# Patient Record
Sex: FEMALE | Race: WHITE | URBAN - METROPOLITAN AREA
[De-identification: names, ages, dates, MRNs, and addresses within clinical notes are randomized per-mention and may not be internally consistent; named-entity substitution may affect disease eponyms.]

---

## 2017-06-08 ENCOUNTER — OUTPATIENT (OUTPATIENT)
Dept: OUTPATIENT SERVICES | Facility: HOSPITAL | Age: 67
LOS: 1 days | Discharge: HOME | End: 2017-06-08

## 2017-06-08 DIAGNOSIS — M17.10 UNILATERAL PRIMARY OSTEOARTHRITIS, UNSPECIFIED KNEE: ICD-10-CM

## 2017-06-08 PROBLEM — Z00.00 ENCOUNTER FOR PREVENTIVE HEALTH EXAMINATION: Status: ACTIVE | Noted: 2017-06-08

## 2017-06-13 PROBLEM — N60.92 ATYPICAL DUCTAL HYPERPLASIA OF LEFT BREAST: Status: ACTIVE | Noted: 2017-06-13

## 2017-06-13 PROBLEM — D24.2 INTRADUCTAL PAPILLOMA OF LEFT BREAST: Status: ACTIVE | Noted: 2017-06-13

## 2017-06-15 ENCOUNTER — APPOINTMENT (OUTPATIENT)
Dept: BREAST CENTER | Facility: CLINIC | Age: 67
End: 2017-06-15

## 2017-06-15 VITALS
SYSTOLIC BLOOD PRESSURE: 140 MMHG | WEIGHT: 200 LBS | HEIGHT: 62 IN | BODY MASS INDEX: 36.8 KG/M2 | DIASTOLIC BLOOD PRESSURE: 84 MMHG

## 2017-06-15 DIAGNOSIS — K21.9 GASTRO-ESOPHAGEAL REFLUX DISEASE W/OUT ESOPHAGITIS: ICD-10-CM

## 2017-06-15 DIAGNOSIS — I10 ESSENTIAL (PRIMARY) HYPERTENSION: ICD-10-CM

## 2017-06-15 DIAGNOSIS — N60.92 UNSPECIFIED BENIGN MAMMARY DYSPLASIA OF LEFT BREAST: ICD-10-CM

## 2017-06-15 DIAGNOSIS — Z87.891 PERSONAL HISTORY OF NICOTINE DEPENDENCE: ICD-10-CM

## 2017-06-15 DIAGNOSIS — D24.2 BENIGN NEOPLASM OF LEFT BREAST: ICD-10-CM

## 2017-06-28 ENCOUNTER — OUTPATIENT (OUTPATIENT)
Dept: PREADMISSION | Facility: HOSPITAL | Age: 67
LOS: 1 days | Discharge: HOME | End: 2017-06-28

## 2017-06-28 DIAGNOSIS — Z12.31 ENCOUNTER FOR SCREENING MAMMOGRAM FOR MALIGNANT NEOPLASM OF BREAST: ICD-10-CM

## 2017-06-28 DIAGNOSIS — Z01.818 ENCOUNTER FOR OTHER PREPROCEDURAL EXAMINATION: ICD-10-CM

## 2017-06-28 DIAGNOSIS — M17.11 UNILATERAL PRIMARY OSTEOARTHRITIS, RIGHT KNEE: ICD-10-CM

## 2017-06-28 DIAGNOSIS — M17.10 UNILATERAL PRIMARY OSTEOARTHRITIS, UNSPECIFIED KNEE: ICD-10-CM

## 2017-06-29 DIAGNOSIS — E11.9 TYPE 2 DIABETES MELLITUS WITHOUT COMPLICATIONS: ICD-10-CM

## 2017-06-29 DIAGNOSIS — G47.30 SLEEP APNEA, UNSPECIFIED: ICD-10-CM

## 2017-06-29 DIAGNOSIS — K21.9 GASTRO-ESOPHAGEAL REFLUX DISEASE WITHOUT ESOPHAGITIS: ICD-10-CM

## 2017-06-29 DIAGNOSIS — I10 ESSENTIAL (PRIMARY) HYPERTENSION: ICD-10-CM

## 2017-07-19 ENCOUNTER — INPATIENT (INPATIENT)
Facility: HOSPITAL | Age: 67
LOS: 3 days | Discharge: HOME | End: 2017-07-23
Attending: ORTHOPAEDIC SURGERY

## 2017-07-19 DIAGNOSIS — M17.10 UNILATERAL PRIMARY OSTEOARTHRITIS, UNSPECIFIED KNEE: ICD-10-CM

## 2017-07-26 DIAGNOSIS — E11.9 TYPE 2 DIABETES MELLITUS WITHOUT COMPLICATIONS: ICD-10-CM

## 2017-07-26 DIAGNOSIS — E66.01 MORBID (SEVERE) OBESITY DUE TO EXCESS CALORIES: ICD-10-CM

## 2017-07-26 DIAGNOSIS — I10 ESSENTIAL (PRIMARY) HYPERTENSION: ICD-10-CM

## 2017-07-26 DIAGNOSIS — M17.11 UNILATERAL PRIMARY OSTEOARTHRITIS, RIGHT KNEE: ICD-10-CM

## 2017-07-26 DIAGNOSIS — K21.9 GASTRO-ESOPHAGEAL REFLUX DISEASE WITHOUT ESOPHAGITIS: ICD-10-CM

## 2017-07-26 DIAGNOSIS — G47.33 OBSTRUCTIVE SLEEP APNEA (ADULT) (PEDIATRIC): ICD-10-CM

## 2017-12-28 ENCOUNTER — OUTPATIENT (OUTPATIENT)
Dept: OUTPATIENT SERVICES | Facility: HOSPITAL | Age: 67
LOS: 1 days | Discharge: HOME | End: 2017-12-28

## 2017-12-28 DIAGNOSIS — Z96.651 PRESENCE OF RIGHT ARTIFICIAL KNEE JOINT: ICD-10-CM

## 2017-12-28 DIAGNOSIS — M19.90 UNSPECIFIED OSTEOARTHRITIS, UNSPECIFIED SITE: ICD-10-CM

## 2018-06-21 ENCOUNTER — APPOINTMENT (OUTPATIENT)
Dept: BREAST CENTER | Facility: CLINIC | Age: 68
End: 2018-06-21

## 2018-11-17 ENCOUNTER — OUTPATIENT (OUTPATIENT)
Dept: OUTPATIENT SERVICES | Facility: HOSPITAL | Age: 68
LOS: 1 days | Discharge: HOME | End: 2018-11-17

## 2018-11-17 DIAGNOSIS — E66.9 OBESITY, UNSPECIFIED: ICD-10-CM

## 2018-11-17 DIAGNOSIS — E53.9 VITAMIN B DEFICIENCY, UNSPECIFIED: ICD-10-CM

## 2018-11-17 DIAGNOSIS — E55.9 VITAMIN D DEFICIENCY, UNSPECIFIED: ICD-10-CM

## 2018-11-17 DIAGNOSIS — M25.561 PAIN IN RIGHT KNEE: ICD-10-CM

## 2018-11-17 DIAGNOSIS — R73.09 OTHER ABNORMAL GLUCOSE: ICD-10-CM

## 2018-11-17 DIAGNOSIS — M54.30 SCIATICA, UNSPECIFIED SIDE: ICD-10-CM

## 2018-11-17 DIAGNOSIS — I10 ESSENTIAL (PRIMARY) HYPERTENSION: ICD-10-CM

## 2018-11-17 DIAGNOSIS — E78.5 HYPERLIPIDEMIA, UNSPECIFIED: ICD-10-CM

## 2018-11-17 DIAGNOSIS — D64.9 ANEMIA, UNSPECIFIED: ICD-10-CM

## 2019-02-01 ENCOUNTER — OUTPATIENT (OUTPATIENT)
Dept: OUTPATIENT SERVICES | Facility: HOSPITAL | Age: 69
LOS: 1 days | Discharge: HOME | End: 2019-02-01

## 2019-02-01 DIAGNOSIS — M51.14 INTERVERTEBRAL DISC DISORDERS WITH RADICULOPATHY, THORACIC REGION: ICD-10-CM

## 2019-10-19 ENCOUNTER — OUTPATIENT (OUTPATIENT)
Dept: OUTPATIENT SERVICES | Facility: HOSPITAL | Age: 69
LOS: 1 days | Discharge: HOME | End: 2019-10-19

## 2019-10-19 DIAGNOSIS — R73.9 HYPERGLYCEMIA, UNSPECIFIED: ICD-10-CM

## 2019-10-19 DIAGNOSIS — I10 ESSENTIAL (PRIMARY) HYPERTENSION: ICD-10-CM

## 2019-10-19 DIAGNOSIS — E66.9 OBESITY, UNSPECIFIED: ICD-10-CM

## 2019-10-19 DIAGNOSIS — Z79.899 OTHER LONG TERM (CURRENT) DRUG THERAPY: ICD-10-CM

## 2019-10-23 ENCOUNTER — OUTPATIENT (OUTPATIENT)
Dept: OUTPATIENT SERVICES | Facility: HOSPITAL | Age: 69
LOS: 1 days | Discharge: HOME | End: 2019-10-23
Payer: MEDICARE

## 2019-10-23 DIAGNOSIS — Z12.31 ENCOUNTER FOR SCREENING MAMMOGRAM FOR MALIGNANT NEOPLASM OF BREAST: ICD-10-CM

## 2019-10-23 PROCEDURE — 77067 SCR MAMMO BI INCL CAD: CPT | Mod: 26

## 2019-10-23 PROCEDURE — 77063 BREAST TOMOSYNTHESIS BI: CPT | Mod: 26

## 2019-10-24 DIAGNOSIS — Z13.820 ENCOUNTER FOR SCREENING FOR OSTEOPOROSIS: ICD-10-CM

## 2019-10-24 DIAGNOSIS — Z82.62 FAMILY HISTORY OF OSTEOPOROSIS: ICD-10-CM

## 2019-10-24 DIAGNOSIS — N95.9 UNSPECIFIED MENOPAUSAL AND PERIMENOPAUSAL DISORDER: ICD-10-CM

## 2020-02-03 ENCOUNTER — OUTPATIENT (OUTPATIENT)
Dept: OUTPATIENT SERVICES | Facility: HOSPITAL | Age: 70
LOS: 1 days | Discharge: HOME | End: 2020-02-03
Payer: OTHER MISCELLANEOUS

## 2020-02-03 DIAGNOSIS — M53.3 SACROCOCCYGEAL DISORDERS, NOT ELSEWHERE CLASSIFIED: ICD-10-CM

## 2020-02-03 DIAGNOSIS — M16.0 BILATERAL PRIMARY OSTEOARTHRITIS OF HIP: ICD-10-CM

## 2020-02-03 DIAGNOSIS — M47.816 SPONDYLOSIS WITHOUT MYELOPATHY OR RADICULOPATHY, LUMBAR REGION: ICD-10-CM

## 2020-02-03 PROCEDURE — 77012 CT SCAN FOR NEEDLE BIOPSY: CPT | Mod: 26

## 2020-02-03 PROCEDURE — 27096 INJECT SACROILIAC JOINT: CPT | Mod: LT

## 2020-02-03 PROCEDURE — 72192 CT PELVIS W/O DYE: CPT | Mod: 26

## 2020-02-03 NOTE — PROGRESS NOTE ADULT - SUBJECTIVE AND OBJECTIVE BOX
PREOPERATIVE DAY OF PROCEDURE EVALUATION:     I have personally seen and examined this patient. I agree with the history and physical which I have reviewed and noted any changes below:     Plan is for CT-directed left sacroiliac joint injection.    Procedure/ risks/ benefits/ goals/ alternatives were explained. All questions answered. Informed content obtained from patient. Consent placed in chart.

## 2020-06-26 ENCOUNTER — OUTPATIENT (OUTPATIENT)
Dept: OUTPATIENT SERVICES | Facility: HOSPITAL | Age: 70
LOS: 1 days | Discharge: HOME | End: 2020-06-26

## 2020-06-26 DIAGNOSIS — Z11.59 ENCOUNTER FOR SCREENING FOR OTHER VIRAL DISEASES: ICD-10-CM

## 2020-06-29 ENCOUNTER — INPATIENT (INPATIENT)
Facility: HOSPITAL | Age: 70
LOS: 0 days | Discharge: HOME | End: 2020-06-30
Attending: HOSPITALIST | Admitting: HOSPITALIST
Payer: MEDICARE

## 2020-06-29 ENCOUNTER — RESULT REVIEW (OUTPATIENT)
Age: 70
End: 2020-06-29

## 2020-06-29 ENCOUNTER — TRANSCRIPTION ENCOUNTER (OUTPATIENT)
Age: 70
End: 2020-06-29

## 2020-06-29 VITALS
DIASTOLIC BLOOD PRESSURE: 90 MMHG | WEIGHT: 199.96 LBS | TEMPERATURE: 98 F | RESPIRATION RATE: 18 BRPM | SYSTOLIC BLOOD PRESSURE: 147 MMHG | HEIGHT: 62 IN | HEART RATE: 66 BPM

## 2020-06-29 DIAGNOSIS — Z90.89 ACQUIRED ABSENCE OF OTHER ORGANS: Chronic | ICD-10-CM

## 2020-06-29 DIAGNOSIS — Z90.49 ACQUIRED ABSENCE OF OTHER SPECIFIED PARTS OF DIGESTIVE TRACT: Chronic | ICD-10-CM

## 2020-06-29 DIAGNOSIS — M17.10 UNILATERAL PRIMARY OSTEOARTHRITIS, UNSPECIFIED KNEE: Chronic | ICD-10-CM

## 2020-06-29 LAB
ALBUMIN SERPL ELPH-MCNC: 4.3 G/DL — SIGNIFICANT CHANGE UP (ref 3.5–5.2)
ALP SERPL-CCNC: 80 U/L — SIGNIFICANT CHANGE UP (ref 30–115)
ALT FLD-CCNC: 29 U/L — SIGNIFICANT CHANGE UP (ref 0–41)
ANION GAP SERPL CALC-SCNC: 15 MMOL/L — HIGH (ref 7–14)
AST SERPL-CCNC: 29 U/L — SIGNIFICANT CHANGE UP (ref 0–41)
BASOPHILS # BLD AUTO: 0.04 K/UL — SIGNIFICANT CHANGE UP (ref 0–0.2)
BASOPHILS NFR BLD AUTO: 0.3 % — SIGNIFICANT CHANGE UP (ref 0–1)
BILIRUB SERPL-MCNC: 1 MG/DL — SIGNIFICANT CHANGE UP (ref 0.2–1.2)
BUN SERPL-MCNC: 14 MG/DL — SIGNIFICANT CHANGE UP (ref 10–20)
CALCIUM SERPL-MCNC: 9.1 MG/DL — SIGNIFICANT CHANGE UP (ref 8.5–10.1)
CHLORIDE SERPL-SCNC: 102 MMOL/L — SIGNIFICANT CHANGE UP (ref 98–110)
CO2 SERPL-SCNC: 22 MMOL/L — SIGNIFICANT CHANGE UP (ref 17–32)
CREAT SERPL-MCNC: 0.7 MG/DL — SIGNIFICANT CHANGE UP (ref 0.7–1.5)
EOSINOPHIL # BLD AUTO: 0.07 K/UL — SIGNIFICANT CHANGE UP (ref 0–0.7)
EOSINOPHIL NFR BLD AUTO: 0.5 % — SIGNIFICANT CHANGE UP (ref 0–8)
GLUCOSE SERPL-MCNC: 153 MG/DL — HIGH (ref 70–99)
HCT VFR BLD CALC: 35 % — LOW (ref 37–47)
HGB BLD-MCNC: 11.8 G/DL — LOW (ref 12–16)
IMM GRANULOCYTES NFR BLD AUTO: 0.5 % — HIGH (ref 0.1–0.3)
LYMPHOCYTES # BLD AUTO: 1.76 K/UL — SIGNIFICANT CHANGE UP (ref 1.2–3.4)
LYMPHOCYTES # BLD AUTO: 13.5 % — LOW (ref 20.5–51.1)
MCHC RBC-ENTMCNC: 32.7 PG — HIGH (ref 27–31)
MCHC RBC-ENTMCNC: 33.7 G/DL — SIGNIFICANT CHANGE UP (ref 32–37)
MCV RBC AUTO: 97 FL — SIGNIFICANT CHANGE UP (ref 81–99)
MONOCYTES # BLD AUTO: 0.86 K/UL — HIGH (ref 0.1–0.6)
MONOCYTES NFR BLD AUTO: 6.6 % — SIGNIFICANT CHANGE UP (ref 1.7–9.3)
NEUTROPHILS # BLD AUTO: 10.21 K/UL — HIGH (ref 1.4–6.5)
NEUTROPHILS NFR BLD AUTO: 78.6 % — HIGH (ref 42.2–75.2)
NRBC # BLD: 0 /100 WBCS — SIGNIFICANT CHANGE UP (ref 0–0)
PLATELET # BLD AUTO: 236 K/UL — SIGNIFICANT CHANGE UP (ref 130–400)
POTASSIUM SERPL-MCNC: 3.8 MMOL/L — SIGNIFICANT CHANGE UP (ref 3.5–5)
POTASSIUM SERPL-SCNC: 3.8 MMOL/L — SIGNIFICANT CHANGE UP (ref 3.5–5)
PROT SERPL-MCNC: 7.2 G/DL — SIGNIFICANT CHANGE UP (ref 6–8)
RBC # BLD: 3.61 M/UL — LOW (ref 4.2–5.4)
RBC # FLD: 12.2 % — SIGNIFICANT CHANGE UP (ref 11.5–14.5)
SODIUM SERPL-SCNC: 139 MMOL/L — SIGNIFICANT CHANGE UP (ref 135–146)
WBC # BLD: 13 K/UL — HIGH (ref 4.8–10.8)
WBC # FLD AUTO: 13 K/UL — HIGH (ref 4.8–10.8)

## 2020-06-29 PROCEDURE — 93010 ELECTROCARDIOGRAM REPORT: CPT

## 2020-06-29 PROCEDURE — 45381 COLONOSCOPY SUBMUCOUS NJX: CPT | Mod: XU,XP

## 2020-06-29 PROCEDURE — 88307 TISSUE EXAM BY PATHOLOGIST: CPT | Mod: 26

## 2020-06-29 PROCEDURE — 74019 RADEX ABDOMEN 2 VIEWS: CPT | Mod: 26

## 2020-06-29 PROCEDURE — 45172 EXC RECT TUM TRANSANAL FULL: CPT | Mod: XP

## 2020-06-29 RX ORDER — METRONIDAZOLE 500 MG
500 TABLET ORAL EVERY 8 HOURS
Refills: 0 | Status: DISCONTINUED | OUTPATIENT
Start: 2020-06-30 | End: 2020-06-30

## 2020-06-29 RX ORDER — ATENOLOL 25 MG/1
1 TABLET ORAL
Qty: 0 | Refills: 0 | DISCHARGE

## 2020-06-29 RX ORDER — PANTOPRAZOLE SODIUM 20 MG/1
40 TABLET, DELAYED RELEASE ORAL
Refills: 0 | Status: DISCONTINUED | OUTPATIENT
Start: 2020-06-29 | End: 2020-06-30

## 2020-06-29 RX ORDER — HEPARIN SODIUM 5000 [USP'U]/ML
5000 INJECTION INTRAVENOUS; SUBCUTANEOUS EVERY 12 HOURS
Refills: 0 | Status: DISCONTINUED | OUTPATIENT
Start: 2020-06-29 | End: 2020-06-30

## 2020-06-29 RX ORDER — CIPROFLOXACIN LACTATE 400MG/40ML
400 VIAL (ML) INTRAVENOUS ONCE
Refills: 0 | Status: COMPLETED | OUTPATIENT
Start: 2020-06-29 | End: 2020-06-29

## 2020-06-29 RX ORDER — ACETAMINOPHEN 500 MG
1000 TABLET ORAL ONCE
Refills: 0 | Status: COMPLETED | OUTPATIENT
Start: 2020-06-29 | End: 2020-06-29

## 2020-06-29 RX ORDER — METRONIDAZOLE 500 MG
500 TABLET ORAL ONCE
Refills: 0 | Status: COMPLETED | OUTPATIENT
Start: 2020-06-29 | End: 2020-06-29

## 2020-06-29 RX ORDER — HYDROMORPHONE HYDROCHLORIDE 2 MG/ML
0.5 INJECTION INTRAMUSCULAR; INTRAVENOUS; SUBCUTANEOUS
Refills: 0 | Status: DISCONTINUED | OUTPATIENT
Start: 2020-06-29 | End: 2020-06-30

## 2020-06-29 RX ORDER — SODIUM CHLORIDE 9 MG/ML
1000 INJECTION, SOLUTION INTRAVENOUS
Refills: 0 | Status: DISCONTINUED | OUTPATIENT
Start: 2020-06-29 | End: 2020-06-29

## 2020-06-29 RX ORDER — CIPROFLOXACIN LACTATE 400MG/40ML
400 VIAL (ML) INTRAVENOUS EVERY 12 HOURS
Refills: 0 | Status: DISCONTINUED | OUTPATIENT
Start: 2020-06-30 | End: 2020-06-30

## 2020-06-29 RX ORDER — PERINDOPRIL ERBUMINE 8 MG/1
1 TABLET ORAL
Qty: 0 | Refills: 0 | DISCHARGE

## 2020-06-29 RX ORDER — LISINOPRIL 2.5 MG/1
20 TABLET ORAL DAILY
Refills: 0 | Status: DISCONTINUED | OUTPATIENT
Start: 2020-06-29 | End: 2020-06-30

## 2020-06-29 RX ORDER — CIPROFLOXACIN LACTATE 400MG/40ML
VIAL (ML) INTRAVENOUS
Refills: 0 | Status: DISCONTINUED | OUTPATIENT
Start: 2020-06-29 | End: 2020-06-30

## 2020-06-29 RX ORDER — SODIUM CHLORIDE 9 MG/ML
1000 INJECTION, SOLUTION INTRAVENOUS
Refills: 0 | Status: DISCONTINUED | OUTPATIENT
Start: 2020-06-29 | End: 2020-06-30

## 2020-06-29 RX ORDER — SIMETHICONE 80 MG/1
80 TABLET, CHEWABLE ORAL ONCE
Refills: 0 | Status: COMPLETED | OUTPATIENT
Start: 2020-06-29 | End: 2020-06-29

## 2020-06-29 RX ORDER — CHLORHEXIDINE GLUCONATE 213 G/1000ML
1 SOLUTION TOPICAL
Refills: 0 | Status: DISCONTINUED | OUTPATIENT
Start: 2020-06-29 | End: 2020-06-30

## 2020-06-29 RX ORDER — ATENOLOL 25 MG/1
25 TABLET ORAL DAILY
Refills: 0 | Status: DISCONTINUED | OUTPATIENT
Start: 2020-06-29 | End: 2020-06-30

## 2020-06-29 RX ORDER — METRONIDAZOLE 500 MG
TABLET ORAL
Refills: 0 | Status: DISCONTINUED | OUTPATIENT
Start: 2020-06-29 | End: 2020-06-30

## 2020-06-29 RX ADMIN — Medication 100 MILLIGRAM(S): at 19:53

## 2020-06-29 RX ADMIN — ATENOLOL 25 MILLIGRAM(S): 25 TABLET ORAL at 22:01

## 2020-06-29 RX ADMIN — PANTOPRAZOLE SODIUM 40 MILLIGRAM(S): 20 TABLET, DELAYED RELEASE ORAL at 19:53

## 2020-06-29 RX ADMIN — HYDROMORPHONE HYDROCHLORIDE 0.5 MILLIGRAM(S): 2 INJECTION INTRAMUSCULAR; INTRAVENOUS; SUBCUTANEOUS at 17:24

## 2020-06-29 RX ADMIN — Medication 400 MILLIGRAM(S): at 16:11

## 2020-06-29 RX ADMIN — SIMETHICONE 80 MILLIGRAM(S): 80 TABLET, CHEWABLE ORAL at 20:52

## 2020-06-29 RX ADMIN — LISINOPRIL 20 MILLIGRAM(S): 2.5 TABLET ORAL at 22:01

## 2020-06-29 RX ADMIN — HYDROMORPHONE HYDROCHLORIDE 0.5 MILLIGRAM(S): 2 INJECTION INTRAMUSCULAR; INTRAVENOUS; SUBCUTANEOUS at 16:11

## 2020-06-29 RX ADMIN — SODIUM CHLORIDE 125 MILLILITER(S): 9 INJECTION, SOLUTION INTRAVENOUS at 16:16

## 2020-06-29 RX ADMIN — HYDROMORPHONE HYDROCHLORIDE 0.5 MILLIGRAM(S): 2 INJECTION INTRAMUSCULAR; INTRAVENOUS; SUBCUTANEOUS at 21:50

## 2020-06-29 RX ADMIN — Medication 200 MILLIGRAM(S): at 19:53

## 2020-06-29 NOTE — H&P ADULT - NSICDXPASTSURGICALHX_GEN_ALL_CORE_FT
PAST SURGICAL HISTORY:  History of cholecystectomy     History of tonsillectomy     Knee arthropathy right knee

## 2020-06-29 NOTE — H&P ADULT - NSHPPHYSICALEXAM_GEN_ALL_CORE
Vital Signs Last 24 Hrs  T(C): 35.9 (29 Jun 2020 18:26), Max: 36.8 (29 Jun 2020 13:08)  T(F): 96.6 (29 Jun 2020 18:26), Max: 98.2 (29 Jun 2020 13:08)  HR: 54 (29 Jun 2020 18:26) (53 - 66)  BP: 134/61 (29 Jun 2020 18:26) (134/61 - 178/81)  BP(mean): --  RR: 18 (29 Jun 2020 18:26) (18 - 18)  SpO2: 96% (29 Jun 2020 16:22) (96% - 100%)        GENERAL: NAD, lying in bed comfortably  HEAD:  Atraumatic, Normocephalic  EYES: EOMI, PERRLA, conjunctiva and sclera clear  ENT: Dry mucous membranes  CHEST/LUNG: Clear to auscultation bilaterally; No rales, rhonchi, wheezing, or rubs. Unlabored respirations  HEART: Regular rate and rhythm; No murmurs, rubs, or gallops  ABDOMEN: Bowel sounds present; Soft, Diffusely tender  EXTREMITIES: No clubbing, cyanosis, or edema  NERVOUS SYSTEM:  Alert & Oriented X3, speech clear. No deficits   MSK: FROM all 4 extremities, full and equal strength  SKIN: No rashes or lesions

## 2020-06-29 NOTE — PRE-ANESTHESIA EVALUATION ADULT - NSANTHOSAYNRD_GEN_A_CORE
No. JOANN screening performed.  STOP BANG Legend: 0-2 = LOW Risk; 3-4 = INTERMEDIATE Risk; 5-8 = HIGH Risk

## 2020-06-29 NOTE — H&P ADULT - ASSESSMENT
Plan colonoscopy with EMR 71 y/o F with PMH of diverticulosis, GERD, HTN, JOANN presents s/p polypectomy in a sigmoid diverticulum. During the polypectomy the pouch was closed with clips as well. Before the procedure she had no complaints. Now she has sharp abdominal pain, 7/10, located mostly in lower 2 quadrants with some radiation to upper 2 quadrants. She also reports burping. Dilaudid helped the pain but nothing else seemed to make it better or worse. She has not eaten or drank anything since the procedure. She denies any other complaints, including SOB, chest pain, nausea, vomiting, constipation, diarrhea, change in stool color.    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

## 2020-06-29 NOTE — H&P ADULT - NSICDXFAMILYHX_GEN_ALL_CORE_FT
FAMILY HISTORY:  Family history malignant neoplasm of biliary tract  FH: heart disease  FH: type 2 diabetes

## 2020-06-29 NOTE — H&P ADULT - ATTENDING COMMENTS
71 YO F with a PMH of diverticulosis, GERD, HTN, and JOANN who is being admitted to the medicine service for eval of ABD pain that started after her polypectomy in a sigmoid diverticulum today. Described as sharp and located in the lower ABD. Spoke with GI and they believe it is post-polypectomy syndrome. Given pain meds and admitted to medicine.     Physical exam shows pt in NAD. VSS, afebrile, not hypoxic on RA. A&Ox3. Non-focal neuro exam. Muscle strength/sensation intact. CTA B/L with no W/C/R. RRR, no M/G/R. ABD is soft with diffuse TTP, normoactive BSs. LEs without swelling. No rashes. Labs and radiology as above.     ABD pain s/p polypectomy in a sigmoid diverticulum, likely post-polypectomy syndrome. Serial ABD exams. Monitor BMs. PRN pain meds. KUB. IVFs. NPO. GI consult. IV ABXs (Cipro/Flagyl)    HX of diverticulosis, GERD, HTN, and JOANN. Restart home meds. GI and DVT PPX. Inform PCP of pt's admission to hospital. My note supersedes the residents note.

## 2020-06-29 NOTE — H&P ADULT - HISTORY OF PRESENT ILLNESS
Patient presents for colonoscopy with EMR 69 y/o F with PMH of diverticulosis, GERD, HTN, JOANN presents s/p polypectomy in a sigmoid diverticulum. During the polypectomy the pouch was closed with clips as well. Before the procedure she had no complaints. Now she has sharp abdominal pain, 7/10, located mostly in lower 2 quadrants with some radiation to upper 2 quadrants. She also reports burping. Dilaudid helped the pain but nothing else seemed to make it better or worse. She has not eaten or drank anything since the procedure. She denies any other complaints, including SOB, chest pain, nausea, vomiting, constipation, diarrhea, change in stool color. 69 y/o F with PMH of diverticulosis, GERD, HTN, JOANN presents with acute abdominal pain s/p polypectomy in a sigmoid diverticulum. During the polypectomy the pouch was closed with clips as well. Before the procedure she had no complaints. Now she has sharp abdominal pain, 7/10, located mostly in lower 2 quadrants with some radiation to upper 2 quadrants. She also reports burping. Dilaudid helped the pain but nothing else seemed to make it better or worse. She has not eaten or drank anything since the procedure. She denies any other complaints, including SOB, chest pain, nausea, vomiting, constipation, diarrhea, change in stool color.

## 2020-06-29 NOTE — CHART NOTE - NSCHARTNOTEFT_GEN_A_CORE
PACU ANESTHESIA ADMISSION NOTE      Procedure: colonoscopy with removal polyp  Post op diagnosis: colon polyp     ____  Intubated  TV:______       Rate: ______      FiO2: ______    __x__  Patent Airway    __x__  Full return of protective reflexes    __x__  Full recovery from anesthesia / back to baseline status    Vitals:  T(C): 97.6  HR: 66 (06-29-20 @ 14:30) (66 - 66)  BP:160/77  RR: 18  SpO2: 100%    Mental Status:  __x__ Awake   _____ Alert   _____ Drowsy   _____ Sedated    Nausea/Vomiting:  ____ NO  ______Yes,   See Post - Op Orders          Pain Scale (0-10):  __4___    Treatment: ____ None    ____ See Post - Op/PCA Orders    Post - Operative Fluids:   ____ Oral   __x_ See Post - Op Orders    Plan: Discharge:   ____Home       ___x_Floor     _____Critical Care    _____  Other:_________________    Comments: uneventful anesthesia course no complications. VItals stable. Pt transferred to PACU

## 2020-06-29 NOTE — H&P ADULT - NSHPREVIEWOFSYSTEMS_GEN_ALL_CORE
CONSTITUTIONAL: No weakness, fevers or chills  EYES/ENT: No visual changes;  No vertigo or throat pain   NECK: No pain or stiffness  RESPIRATORY: No cough, wheezing, hemoptysis; No shortness of breath  CARDIOVASCULAR: No chest pain or palpitations  GASTROINTESTINAL: See HPI  GENITOURINARY: No dysuria, frequency or hematuria  NEUROLOGICAL: No numbness or weakness  SKIN: No itching, rashes

## 2020-06-30 ENCOUNTER — TRANSCRIPTION ENCOUNTER (OUTPATIENT)
Age: 70
End: 2020-06-30

## 2020-06-30 VITALS
SYSTOLIC BLOOD PRESSURE: 129 MMHG | TEMPERATURE: 98 F | HEART RATE: 58 BPM | DIASTOLIC BLOOD PRESSURE: 68 MMHG | RESPIRATION RATE: 18 BRPM

## 2020-06-30 LAB
ANION GAP SERPL CALC-SCNC: 13 MMOL/L — SIGNIFICANT CHANGE UP (ref 7–14)
BUN SERPL-MCNC: 10 MG/DL — SIGNIFICANT CHANGE UP (ref 10–20)
CALCIUM SERPL-MCNC: 9.3 MG/DL — SIGNIFICANT CHANGE UP (ref 8.5–10.1)
CHLORIDE SERPL-SCNC: 104 MMOL/L — SIGNIFICANT CHANGE UP (ref 98–110)
CO2 SERPL-SCNC: 23 MMOL/L — SIGNIFICANT CHANGE UP (ref 17–32)
CREAT SERPL-MCNC: 0.7 MG/DL — SIGNIFICANT CHANGE UP (ref 0.7–1.5)
GLUCOSE SERPL-MCNC: 138 MG/DL — HIGH (ref 70–99)
HCT VFR BLD CALC: 36.8 % — LOW (ref 37–47)
HCV AB S/CO SERPL IA: 0.03 COI — SIGNIFICANT CHANGE UP
HCV AB SERPL-IMP: SIGNIFICANT CHANGE UP
HGB BLD-MCNC: 12.3 G/DL — SIGNIFICANT CHANGE UP (ref 12–16)
MCHC RBC-ENTMCNC: 31.7 PG — HIGH (ref 27–31)
MCHC RBC-ENTMCNC: 33.4 G/DL — SIGNIFICANT CHANGE UP (ref 32–37)
MCV RBC AUTO: 94.8 FL — SIGNIFICANT CHANGE UP (ref 81–99)
NRBC # BLD: 0 /100 WBCS — SIGNIFICANT CHANGE UP (ref 0–0)
PLATELET # BLD AUTO: 260 K/UL — SIGNIFICANT CHANGE UP (ref 130–400)
POTASSIUM SERPL-MCNC: 4.3 MMOL/L — SIGNIFICANT CHANGE UP (ref 3.5–5)
POTASSIUM SERPL-SCNC: 4.3 MMOL/L — SIGNIFICANT CHANGE UP (ref 3.5–5)
RBC # BLD: 3.88 M/UL — LOW (ref 4.2–5.4)
RBC # FLD: 12.3 % — SIGNIFICANT CHANGE UP (ref 11.5–14.5)
SODIUM SERPL-SCNC: 140 MMOL/L — SIGNIFICANT CHANGE UP (ref 135–146)
WBC # BLD: 10.77 K/UL — SIGNIFICANT CHANGE UP (ref 4.8–10.8)
WBC # FLD AUTO: 10.77 K/UL — SIGNIFICANT CHANGE UP (ref 4.8–10.8)

## 2020-06-30 PROCEDURE — 99223 1ST HOSP IP/OBS HIGH 75: CPT

## 2020-06-30 PROCEDURE — 99223 1ST HOSP IP/OBS HIGH 75: CPT | Mod: AI

## 2020-06-30 RX ORDER — ONDANSETRON 8 MG/1
4 TABLET, FILM COATED ORAL EVERY 8 HOURS
Refills: 0 | Status: DISCONTINUED | OUTPATIENT
Start: 2020-06-30 | End: 2020-06-30

## 2020-06-30 RX ORDER — ONDANSETRON 8 MG/1
1 TABLET, FILM COATED ORAL
Qty: 21 | Refills: 0
Start: 2020-06-30 | End: 2020-07-06

## 2020-06-30 RX ORDER — ACETAMINOPHEN 500 MG
2 TABLET ORAL
Qty: 0 | Refills: 0 | DISCHARGE
Start: 2020-06-30

## 2020-06-30 RX ORDER — ACETAMINOPHEN 500 MG
650 TABLET ORAL EVERY 6 HOURS
Refills: 0 | Status: DISCONTINUED | OUTPATIENT
Start: 2020-06-30 | End: 2020-06-30

## 2020-06-30 RX ORDER — CIPROFLOXACIN LACTATE 400MG/40ML
1 VIAL (ML) INTRAVENOUS
Qty: 12 | Refills: 0
Start: 2020-06-30 | End: 2020-07-05

## 2020-06-30 RX ORDER — MORPHINE SULFATE 50 MG/1
2 CAPSULE, EXTENDED RELEASE ORAL EVERY 4 HOURS
Refills: 0 | Status: DISCONTINUED | OUTPATIENT
Start: 2020-06-30 | End: 2020-06-30

## 2020-06-30 RX ORDER — PANTOPRAZOLE SODIUM 20 MG/1
1 TABLET, DELAYED RELEASE ORAL
Qty: 14 | Refills: 0
Start: 2020-06-30 | End: 2020-07-13

## 2020-06-30 RX ORDER — METRONIDAZOLE 500 MG
1 TABLET ORAL
Qty: 18 | Refills: 0
Start: 2020-06-30 | End: 2020-07-05

## 2020-06-30 RX ORDER — KETOROLAC TROMETHAMINE 30 MG/ML
30 SYRINGE (ML) INJECTION ONCE
Refills: 0 | Status: DISCONTINUED | OUTPATIENT
Start: 2020-06-30 | End: 2020-06-30

## 2020-06-30 RX ADMIN — HYDROMORPHONE HYDROCHLORIDE 0.5 MILLIGRAM(S): 2 INJECTION INTRAMUSCULAR; INTRAVENOUS; SUBCUTANEOUS at 02:13

## 2020-06-30 RX ADMIN — CHLORHEXIDINE GLUCONATE 1 APPLICATION(S): 213 SOLUTION TOPICAL at 05:51

## 2020-06-30 RX ADMIN — Medication 30 MILLIGRAM(S): at 09:19

## 2020-06-30 RX ADMIN — SODIUM CHLORIDE 125 MILLILITER(S): 9 INJECTION, SOLUTION INTRAVENOUS at 01:00

## 2020-06-30 RX ADMIN — Medication 100 MILLIGRAM(S): at 13:21

## 2020-06-30 RX ADMIN — Medication 100 MILLIGRAM(S): at 05:51

## 2020-06-30 RX ADMIN — PANTOPRAZOLE SODIUM 40 MILLIGRAM(S): 20 TABLET, DELAYED RELEASE ORAL at 05:51

## 2020-06-30 RX ADMIN — Medication 200 MILLIGRAM(S): at 05:51

## 2020-06-30 NOTE — DISCHARGE NOTE PROVIDER - NSDCCPCAREPLAN_GEN_ALL_CORE_FT
PRINCIPAL DISCHARGE DIAGNOSIS  Diagnosis: Postpolypectomy electrocoagulation syndrome  Assessment and Plan of Treatment: # Acute abdominal pain s/p Polypectomy  - continue with clear liquid diet for two days  - KUB with no signs of perforation   - PO cipro and flagyl for seven day course   - Pain control with tylenol   patient to return to ED if symptoms presist or worsen

## 2020-06-30 NOTE — CONSULT NOTE ADULT - ASSESSMENT
Patient is a 69 y/o female with PMHx of diverticulosis, GERD, HTN, and JOANN that presents with acute abdominal pain s/p polypectomy in a sigmoid diverticulum. Patient had large villous polyp removed from, sigmoid Colon by utilizing Ovesco clip and EMR done in a piecemeal fashion.  Patient had sharp abdominal pain, 7/10, located mostly in lower 2 quadrants with some radiation to upper 2 quadrants. She was ultimately admitted for pain control. She notes she has been passing gas but no stool. She has pain but only has been getting Tylenol. She requests a diet and to go home. Diet to be advanced for lunch and if pain controlled may be able to go home today.     Sigmoid villous Polyp/ S/P Endoscopic intervention  - Cipro and flagyl for seven days  - Clear diet, may advance to low fiber in afternoon/ or GI soft   - Morphine ordered for prn pain  - Patient does want to go home today   - Miralax 17gram nightly to avoid constipation  - Follow up with Dr Lopez in 1-2 weeks

## 2020-06-30 NOTE — DISCHARGE NOTE PROVIDER - NSDCMRMEDTOKEN_GEN_ALL_CORE_FT
acetaminophen 325 mg oral tablet: 2 tab(s) orally every 6 hours, As needed, Mild Pain (1 - 3)  atenolol 25 mg oral tablet: 1 tab(s) orally once a day  ciprofloxacin 500 mg oral tablet: 1 tab(s) orally 2 times a day   metroNIDAZOLE 500 mg oral tablet: 1 tab(s) orally every 8 hours   pantoprazole 40 mg oral delayed release tablet: 1 tab(s) orally once a day (before a meal)  perindopril 8 mg oral tablet: 1 tab(s) orally once a day  Zofran 4 mg oral tablet: 1 tab(s) orally every 8 hours

## 2020-06-30 NOTE — PROGRESS NOTE ADULT - ASSESSMENT
69 y/o F with PMH of diverticulosis, GERD, HTN, JOANN presents with acute abdominal pain s/p polypectomy in a sigmoid diverticulum.    # Acute abdominal pain s/p Polypectomy  - Likely post-polypectomy syndrome per GI  - patient has been advanced to clears and is tolerating well   - F/u KUB  - IV cipro and flagyl while admitted, switch to PO cipro flagyl  - Pain control: acetaminophen 650 mg q6 hours, morphine IV 2 mg q 4 hours  -Bentyl 20 mg three times daily before meals for gas   - Manage per GI recs    # GERD  - Protonix    # JOANN  - On home CPAP, will use hospital until someone bringers her own machine    # HTN  - Continue home meds    For potential D/c today or tomorrow     DVT PPX: heparin subq  GI PPX: protonix  Diet: NPO for now  Dispo: Per GI  FULL CODE

## 2020-06-30 NOTE — CONSULT NOTE ADULT - SUBJECTIVE AND OBJECTIVE BOX
Patient is a 69 y/o female with PMHx of diverticulosis, GERD, HTN, and JOANN that presents with acute abdominal pain s/p polypectomy in a sigmoid diverticulum. Patient had large villous polyp removed from, sigmoid Colon by utilizing Ovesco clip and EMR done in a piecemeal fashion.  Patient had sharp abdominal pain, 7/10, located mostly in lower 2 quadrants with some radiation to upper 2 quadrants. She was ultimately admitted for pain control. She notes she has been passing gas but no stool. She has pain but only has been getting Tylenol. She requests a diet and to go home.       PAST MEDICAL & SURGICAL HISTORY:  Diverticulitis  JOANN on CPAP  Chronic GERD  Hypertension  History of tonsillectomy  History of cholecystectomy  Knee arthropathy: right knee        MEDICATIONS  (STANDING):  ATENolol  Tablet 25 milliGRAM(s) Oral daily  chlorhexidine 4% Liquid 1 Application(s) Topical <User Schedule>  ciprofloxacin   IVPB 400 milliGRAM(s) IV Intermittent every 12 hours  ciprofloxacin   IVPB      heparin   Injectable 5000 Unit(s) SubCutaneous every 12 hours  lisinopril 20 milliGRAM(s) Oral daily  metroNIDAZOLE  IVPB      metroNIDAZOLE  IVPB 500 milliGRAM(s) IV Intermittent every 8 hours  pantoprazole    Tablet 40 milliGRAM(s) Oral before breakfast    MEDICATIONS  (PRN):  acetaminophen   Tablet .. 650 milliGRAM(s) Oral every 6 hours PRN Mild Pain (1 - 3)  dicyclomine 20 milliGRAM(s) Oral three times a day before meals PRN gas pain  morphine  - Injectable 2 milliGRAM(s) IV Push every 4 hours PRN Moderate Pain (4 - 6)  ondansetron Injectable 4 milliGRAM(s) IV Push every 8 hours PRN Nausea and/or Vomiting      Allergies  No Known Allergies      Review of Systems  General:  Denies Fatigue, Denies Fever, Denies Weakness ,Denies Weight Loss   HEENT: Denies Trouble Swallowing ,Denies  Sore Throat , Denies Change in hearing/vision/speech ,Denies Dizziness    Cardio: Denies  Chest Pain , Palpitations    Respiratory: Denies worsening of SOB, Denies Cough  Abdomen: See detailed HPI  Neuro: Denies Headache Denies Dizziness, Denies Paresthesias  MSK: Denies pain in Bones/Joints/Muscles   Psych: Patient denies depression, denies suicidal or homicidal ideations  Integ: Patient Denies rash, or new skin lesions         Vital Signs   T(F): 98.4 (30 Jun 2020 05:05), Max: 98.4 (30 Jun 2020 05:05)  HR: 66 (30 Jun 2020 08:18) (53 - 66)  BP: 117/62 (30 Jun 2020 05:05) (117/62 - 178/81)  RR: 18 (30 Jun 2020 05:05) (16 - 18)  SpO2: 97% (30 Jun 2020 08:18) (96% - 100%)  Physical Exam  Gen: NAD  HEENT: NC/AT  Cardio: S1/S2  Resp: CTA B/L  Abdomen: Soft, ND/slight TTP  Neuro: AAOx3  Extremities: FROM x 4      LABS:                        11.8   13.00 )-----------( 236      ( 29 Jun 2020 21:30 )             35.0     06-29    139  |  102  |  14  ----------------------------<  153<H>  3.8   |  22  |  0.7    Ca    9.1      29 Jun 2020 21:30    TPro  7.2  /  Alb  4.3  /  TBili  1.0  /  DBili  x   /  AST  29  /  ALT  29  /  AlkPhos  80  06-29        RADIOLOGY & ADDITIONAL STUDIES:  Xray Abdomen 2 View PORTABLE -Urgent 06.29.20   Impression:    Gaseous distention of the small bowel which is not abnormally dilated. Gaseous distention of the right colon. Findings could be due to colonic insufflation from recent colonoscopy given history of polypectomy. Differential includes an ileus.    Radiopaque foreign body projecting over the left hemisacrum. Clinical correlation is advised.

## 2020-06-30 NOTE — DISCHARGE NOTE NURSING/CASE MANAGEMENT/SOCIAL WORK - PATIENT PORTAL LINK FT
You can access the FollowMyHealth Patient Portal offered by [facility] by registering at the following website: [email or web address]. By joining [email or web address]’s FollowMyHealth portal, you will also be able to view your health information using other applications (apps) compatible with our system.

## 2020-06-30 NOTE — PROGRESS NOTE ADULT - SUBJECTIVE AND OBJECTIVE BOX
DAILY PROGRESS NOTE  ===========================================================    Patient Information:  REJI LILLY  /  70y  /  Female  /  MRN#: [id number]    Hospital Day: 1d     |:::::::::::::::::::::::::::| SUBJECTIVE |:::::::::::::::::::::::::::|    OVERNIGHT EVENTS: patient continues to have 7/10 sharp pain to the left lower and right lower quadrants. The patient was treated with dilaudid with minimal relief of the pain.   TODAY: Patient was seen today at bedside. Review of systems is otherwise negative. The patient was examined and interviewed at the bedside. Per the patient she had an outpatient polypectomy (full thickness) in the sigmoid colon and the opening was closed with metal clips. The patient reported severe pain all over the abdomen that is positional and gets better when she gets up and walks around. The patient was seen walking laps on the floor and tolerating well. She initially denied passing gas or having an bowel movements. She is now passing gas and tolerating clear and ice chips well. The patient is motivated to continue to progress her diet and is asking for discharge today.     |:::::::::::::::::::::::::::| OBJECTIVE |:::::::::::::::::::::::::::|    VITAL SIGNS: Last 24 Hours  T(C): 36.9 (30 Jun 2020 05:05), Max: 36.9 (30 Jun 2020 05:05)  T(F): 98.4 (30 Jun 2020 05:05), Max: 98.4 (30 Jun 2020 05:05)  HR: 66 (30 Jun 2020 08:18) (53 - 66)  BP: 117/62 (30 Jun 2020 05:05) (117/62 - 178/81)  BP(mean): --  RR: 18 (30 Jun 2020 05:05) (16 - 18)  SpO2: 97% (30 Jun 2020 08:18) (96% - 100%)    06-29-20 @ 07:01  -  06-30-20 @ 07:00  --------------------------------------------------------  IN: 1675 mL / OUT: 0 mL / NET: 1675 mL      PHYSICAL EXAM:  GENERAL:   Awake, alert; NAD.  HEENT:  Head NC/AT; Conjunctivae pink, Sclera anicteric; Oral mucosa moist.  CARDIO:   Regular rate; Regular rhythm; S1 & S2.  RESP:   No rales, wheezing, or rhonchi appreciated.  GI:   Soft; NT/ND; + BS; No guarding; No rebound tenderness. large scar on the abdomen from cholecystectomy (50+ years ago),   EXT:   Strength UE 5/5; Strength LE 5/5; trace pitting edema of the lower extremities    SKIN:   Intact.    LAB RESULTS:                        11.8   13.00 )-----------( 236      ( 29 Jun 2020 21:30 )             35.0     06-29    139  |  102  |  14  ----------------------------<  153<H>  3.8   |  22  |  0.7    Ca    9.1      29 Jun 2020 21:30    TPro  7.2  /  Alb  4.3  /  TBili  1.0  /  DBili  x   /  AST  29  /  ALT  29  /  AlkPhos  80  06-29                MICROBIOLOGY:    RADIOLOGY:    ALLERGIES: No Known Allergies      ===========================================================

## 2020-06-30 NOTE — DISCHARGE NOTE PROVIDER - HOSPITAL COURSE
69 y/o F with PMH of diverticulosis, GERD, HTN, JOANN presents with acute abdominal pain s/p polypectomy in a sigmoid diverticulum. During the polypectomy the pouch was closed with clips as well. Before the procedure she had no complaints. Now she has sharp abdominal pain, 7/10, located mostly in lower 2 quadrants with some radiation to upper 2 quadrants. She also reports burping. Dilaudid helped the pain but nothing else seemed to make it better or worse. She has not eaten or drank anything since the procedure. She denies any other complaints, including SOB, chest pain, nausea, vomiting, constipation, diarrhea, change in stool color.        The patient was treated with IV morphine and tylenol. The patient was advanced to clear liquid diet from NPO and is tolerating well with moderate pain. Patient instructed to slowly increase her diet over the next two days as tolerated. She will complete a seven day course of cipro and flagyl as an outpatient. Patient informed to return to the emergency room with worsening of pain.

## 2020-07-02 ENCOUNTER — EMERGENCY (EMERGENCY)
Facility: HOSPITAL | Age: 70
LOS: 0 days | Discharge: HOME | End: 2020-07-02
Attending: STUDENT IN AN ORGANIZED HEALTH CARE EDUCATION/TRAINING PROGRAM | Admitting: STUDENT IN AN ORGANIZED HEALTH CARE EDUCATION/TRAINING PROGRAM
Payer: MEDICARE

## 2020-07-02 VITALS
TEMPERATURE: 98 F | DIASTOLIC BLOOD PRESSURE: 65 MMHG | OXYGEN SATURATION: 99 % | RESPIRATION RATE: 18 BRPM | HEART RATE: 78 BPM | SYSTOLIC BLOOD PRESSURE: 129 MMHG

## 2020-07-02 VITALS
TEMPERATURE: 98 F | HEART RATE: 84 BPM | DIASTOLIC BLOOD PRESSURE: 81 MMHG | SYSTOLIC BLOOD PRESSURE: 160 MMHG | OXYGEN SATURATION: 100 % | HEIGHT: 62 IN | RESPIRATION RATE: 18 BRPM

## 2020-07-02 DIAGNOSIS — R10.0 ACUTE ABDOMEN: ICD-10-CM

## 2020-07-02 DIAGNOSIS — Z79.899 OTHER LONG TERM (CURRENT) DRUG THERAPY: ICD-10-CM

## 2020-07-02 DIAGNOSIS — K64.8 OTHER HEMORRHOIDS: ICD-10-CM

## 2020-07-02 DIAGNOSIS — K21.9 GASTRO-ESOPHAGEAL REFLUX DISEASE WITHOUT ESOPHAGITIS: ICD-10-CM

## 2020-07-02 DIAGNOSIS — Z90.89 ACQUIRED ABSENCE OF OTHER ORGANS: Chronic | ICD-10-CM

## 2020-07-02 DIAGNOSIS — R10.32 LEFT LOWER QUADRANT PAIN: ICD-10-CM

## 2020-07-02 DIAGNOSIS — Z90.49 ACQUIRED ABSENCE OF OTHER SPECIFIED PARTS OF DIGESTIVE TRACT: ICD-10-CM

## 2020-07-02 DIAGNOSIS — D12.5 BENIGN NEOPLASM OF SIGMOID COLON: ICD-10-CM

## 2020-07-02 DIAGNOSIS — K57.30 DIVERTICULOSIS OF LARGE INTESTINE WITHOUT PERFORATION OR ABSCESS WITHOUT BLEEDING: ICD-10-CM

## 2020-07-02 DIAGNOSIS — R14.3 FLATULENCE: ICD-10-CM

## 2020-07-02 DIAGNOSIS — M17.10 UNILATERAL PRIMARY OSTEOARTHRITIS, UNSPECIFIED KNEE: Chronic | ICD-10-CM

## 2020-07-02 DIAGNOSIS — I10 ESSENTIAL (PRIMARY) HYPERTENSION: ICD-10-CM

## 2020-07-02 DIAGNOSIS — Z79.2 LONG TERM (CURRENT) USE OF ANTIBIOTICS: ICD-10-CM

## 2020-07-02 DIAGNOSIS — R10.30 LOWER ABDOMINAL PAIN, UNSPECIFIED: ICD-10-CM

## 2020-07-02 DIAGNOSIS — G47.33 OBSTRUCTIVE SLEEP APNEA (ADULT) (PEDIATRIC): ICD-10-CM

## 2020-07-02 DIAGNOSIS — Z90.89 ACQUIRED ABSENCE OF OTHER ORGANS: ICD-10-CM

## 2020-07-02 DIAGNOSIS — Z90.49 ACQUIRED ABSENCE OF OTHER SPECIFIED PARTS OF DIGESTIVE TRACT: Chronic | ICD-10-CM

## 2020-07-02 DIAGNOSIS — Z79.891 LONG TERM (CURRENT) USE OF OPIATE ANALGESIC: ICD-10-CM

## 2020-07-02 DIAGNOSIS — G89.18 OTHER ACUTE POSTPROCEDURAL PAIN: ICD-10-CM

## 2020-07-02 DIAGNOSIS — R10.31 RIGHT LOWER QUADRANT PAIN: ICD-10-CM

## 2020-07-02 PROBLEM — K57.92 DIVERTICULITIS OF INTESTINE, PART UNSPECIFIED, WITHOUT PERFORATION OR ABSCESS WITHOUT BLEEDING: Chronic | Status: ACTIVE | Noted: 2020-06-29

## 2020-07-02 LAB
ALBUMIN SERPL ELPH-MCNC: 4.3 G/DL — SIGNIFICANT CHANGE UP (ref 3.5–5.2)
ALP SERPL-CCNC: 65 U/L — SIGNIFICANT CHANGE UP (ref 30–115)
ALT FLD-CCNC: 23 U/L — SIGNIFICANT CHANGE UP (ref 0–41)
AMYLASE P1 CFR SERPL: 24 U/L — LOW (ref 25–115)
ANION GAP SERPL CALC-SCNC: 13 MMOL/L — SIGNIFICANT CHANGE UP (ref 7–14)
APPEARANCE UR: CLEAR — SIGNIFICANT CHANGE UP
AST SERPL-CCNC: 34 U/L — SIGNIFICANT CHANGE UP (ref 0–41)
BASOPHILS # BLD AUTO: 0.02 K/UL — SIGNIFICANT CHANGE UP (ref 0–0.2)
BASOPHILS NFR BLD AUTO: 0.2 % — SIGNIFICANT CHANGE UP (ref 0–1)
BILIRUB SERPL-MCNC: 1 MG/DL — SIGNIFICANT CHANGE UP (ref 0.2–1.2)
BILIRUB UR-MCNC: NEGATIVE — SIGNIFICANT CHANGE UP
BUN SERPL-MCNC: 12 MG/DL — SIGNIFICANT CHANGE UP (ref 10–20)
CALCIUM SERPL-MCNC: 9 MG/DL — SIGNIFICANT CHANGE UP (ref 8.5–10.1)
CHLORIDE SERPL-SCNC: 100 MMOL/L — SIGNIFICANT CHANGE UP (ref 98–110)
CO2 SERPL-SCNC: 23 MMOL/L — SIGNIFICANT CHANGE UP (ref 17–32)
COLOR SPEC: SIGNIFICANT CHANGE UP
CREAT SERPL-MCNC: 0.7 MG/DL — SIGNIFICANT CHANGE UP (ref 0.7–1.5)
DIFF PNL FLD: NEGATIVE — SIGNIFICANT CHANGE UP
EOSINOPHIL # BLD AUTO: 0.02 K/UL — SIGNIFICANT CHANGE UP (ref 0–0.7)
EOSINOPHIL NFR BLD AUTO: 0.2 % — SIGNIFICANT CHANGE UP (ref 0–8)
GLUCOSE SERPL-MCNC: 113 MG/DL — HIGH (ref 70–99)
GLUCOSE UR QL: NEGATIVE — SIGNIFICANT CHANGE UP
HCT VFR BLD CALC: 39.9 % — SIGNIFICANT CHANGE UP (ref 37–47)
HGB BLD-MCNC: 13.8 G/DL — SIGNIFICANT CHANGE UP (ref 12–16)
IMM GRANULOCYTES NFR BLD AUTO: 0.3 % — SIGNIFICANT CHANGE UP (ref 0.1–0.3)
KETONES UR-MCNC: ABNORMAL
LACTATE SERPL-SCNC: 1.9 MMOL/L — SIGNIFICANT CHANGE UP (ref 0.7–2)
LEUKOCYTE ESTERASE UR-ACNC: NEGATIVE — SIGNIFICANT CHANGE UP
LIDOCAIN IGE QN: 21 U/L — SIGNIFICANT CHANGE UP (ref 7–60)
LYMPHOCYTES # BLD AUTO: 1.75 K/UL — SIGNIFICANT CHANGE UP (ref 1.2–3.4)
LYMPHOCYTES # BLD AUTO: 14.8 % — LOW (ref 20.5–51.1)
MCHC RBC-ENTMCNC: 32.9 PG — HIGH (ref 27–31)
MCHC RBC-ENTMCNC: 34.6 G/DL — SIGNIFICANT CHANGE UP (ref 32–37)
MCV RBC AUTO: 95.2 FL — SIGNIFICANT CHANGE UP (ref 81–99)
MONOCYTES # BLD AUTO: 0.94 K/UL — HIGH (ref 0.1–0.6)
MONOCYTES NFR BLD AUTO: 7.9 % — SIGNIFICANT CHANGE UP (ref 1.7–9.3)
NEUTROPHILS # BLD AUTO: 9.09 K/UL — HIGH (ref 1.4–6.5)
NEUTROPHILS NFR BLD AUTO: 76.6 % — HIGH (ref 42.2–75.2)
NITRITE UR-MCNC: NEGATIVE — SIGNIFICANT CHANGE UP
NRBC # BLD: 0 /100 WBCS — SIGNIFICANT CHANGE UP (ref 0–0)
PH UR: 6.5 — SIGNIFICANT CHANGE UP (ref 5–8)
PLATELET # BLD AUTO: 290 K/UL — SIGNIFICANT CHANGE UP (ref 130–400)
POTASSIUM SERPL-MCNC: 4.7 MMOL/L — SIGNIFICANT CHANGE UP (ref 3.5–5)
POTASSIUM SERPL-SCNC: 4.7 MMOL/L — SIGNIFICANT CHANGE UP (ref 3.5–5)
PROT SERPL-MCNC: 7.1 G/DL — SIGNIFICANT CHANGE UP (ref 6–8)
PROT UR-MCNC: NEGATIVE — SIGNIFICANT CHANGE UP
RBC # BLD: 4.19 M/UL — LOW (ref 4.2–5.4)
RBC # FLD: 12.2 % — SIGNIFICANT CHANGE UP (ref 11.5–14.5)
SODIUM SERPL-SCNC: 136 MMOL/L — SIGNIFICANT CHANGE UP (ref 135–146)
SP GR SPEC: 1.01 — LOW (ref 1.01–1.02)
SURGICAL PATHOLOGY STUDY: SIGNIFICANT CHANGE UP
UROBILINOGEN FLD QL: SIGNIFICANT CHANGE UP
WBC # BLD: 11.86 K/UL — HIGH (ref 4.8–10.8)
WBC # FLD AUTO: 11.86 K/UL — HIGH (ref 4.8–10.8)

## 2020-07-02 PROCEDURE — 99233 SBSQ HOSP IP/OBS HIGH 50: CPT | Mod: 24

## 2020-07-02 PROCEDURE — 99285 EMERGENCY DEPT VISIT HI MDM: CPT

## 2020-07-02 PROCEDURE — 74177 CT ABD & PELVIS W/CONTRAST: CPT | Mod: 26

## 2020-07-02 RX ORDER — FAMOTIDINE 10 MG/ML
20 INJECTION INTRAVENOUS ONCE
Refills: 0 | Status: COMPLETED | OUTPATIENT
Start: 2020-07-02 | End: 2020-07-02

## 2020-07-02 RX ORDER — OXYCODONE AND ACETAMINOPHEN 5; 325 MG/1; MG/1
1 TABLET ORAL ONCE
Refills: 0 | Status: DISCONTINUED | OUTPATIENT
Start: 2020-07-02 | End: 2020-07-02

## 2020-07-02 RX ORDER — IOHEXOL 300 MG/ML
30 INJECTION, SOLUTION INTRAVENOUS ONCE
Refills: 0 | Status: COMPLETED | OUTPATIENT
Start: 2020-07-02 | End: 2020-07-02

## 2020-07-02 RX ORDER — MORPHINE SULFATE 50 MG/1
2 CAPSULE, EXTENDED RELEASE ORAL ONCE
Refills: 0 | Status: DISCONTINUED | OUTPATIENT
Start: 2020-07-02 | End: 2020-07-02

## 2020-07-02 RX ORDER — MORPHINE SULFATE 50 MG/1
6 CAPSULE, EXTENDED RELEASE ORAL ONCE
Refills: 0 | Status: DISCONTINUED | OUTPATIENT
Start: 2020-07-02 | End: 2020-07-02

## 2020-07-02 RX ORDER — ONDANSETRON 8 MG/1
4 TABLET, FILM COATED ORAL ONCE
Refills: 0 | Status: COMPLETED | OUTPATIENT
Start: 2020-07-02 | End: 2020-07-02

## 2020-07-02 RX ADMIN — IOHEXOL 30 MILLILITER(S): 300 INJECTION, SOLUTION INTRAVENOUS at 16:24

## 2020-07-02 RX ADMIN — OXYCODONE AND ACETAMINOPHEN 1 TABLET(S): 5; 325 TABLET ORAL at 21:44

## 2020-07-02 RX ADMIN — ONDANSETRON 4 MILLIGRAM(S): 8 TABLET, FILM COATED ORAL at 16:27

## 2020-07-02 RX ADMIN — FAMOTIDINE 20 MILLIGRAM(S): 10 INJECTION INTRAVENOUS at 16:26

## 2020-07-02 RX ADMIN — MORPHINE SULFATE 2 MILLIGRAM(S): 50 CAPSULE, EXTENDED RELEASE ORAL at 17:07

## 2020-07-02 NOTE — ED ADULT NURSE NOTE - CAS ELECT INFOMATION PROVIDED
Due for a dental visit but will schedule once it's safe  Discussed HPV vaccination and information given  DC instructions

## 2020-07-02 NOTE — ED PROVIDER NOTE - CLINICAL SUMMARY MEDICAL DECISION MAKING FREE TEXT BOX
70 year old female with a pmh of gerd htn shankar diverticulosis s/p polypectomy currently on cipro & flagyl presented to the ed for abdominal pain. Abdominal pain was 2/10 rlq to llq and intermittent. Labs drawn CT performed. GI consulted, saw patient and recommended discharge with outpatient follow up. Patient will continue cipro flagyl and f.u outpatient. Return precautions given.

## 2020-07-02 NOTE — ED PROVIDER NOTE - PATIENT PORTAL LINK FT
You can access the FollowMyHealth Patient Portal offered by [facility] by registering at the following website: [email or web address]. By joining [employer]’s FollowMyHealth portal, you will also be able to view your health information using other applications (apps) compatible with our system.

## 2020-07-02 NOTE — CONSULT NOTE ADULT - ASSESSMENT
Patient is a 71 y/o female with PMHx of diverticulosis, GERD, HTN, and JOANN that presents with acute abdominal pain s/p polypectomy in a sigmoid diverticulum. Patient had large villous polyp removed from, sigmoid Colon by utilizing Ovesco clip and EMR done in a piecemeal fashion.  Patient had sharp abdominal pain and presented to ER. Appears well. Advise CT of abdomen with oral contrast. If contrast passes, can continue her cipro and flagyl as prescribed and be discharged home. If CT notes extravasation or obstruction please admit and contact [facility].     Sigmoid villous Polyp/ S/P Endoscopic intervention  - Cipro and flagyl to be continued  - CT of abdomen with oral contrast ( refer to above)  - May need admission if pain not controlled or CT scan as noted above

## 2020-07-02 NOTE — CONSULT NOTE ADULT - SUBJECTIVE AND OBJECTIVE BOX
Patient is a 69 y/o female with PMHx of diverticulosis, GERD, HTN, and JOANN that presents with acute abdominal pain s/p polypectomy in a sigmoid diverticulum. Patient had large villous polyp removed from, sigmoid Colon by utilizing Ovesco clip and EMR done in a piecemeal fashion.  Patient had sharp abdominal pain, and presented from home. she notes no blood in stool, fever, chills, or sweats.     PAST MEDICAL & SURGICAL HISTORY:  Diverticulitis  JOANN on CPAP  Chronic GERD  Hypertension  History of tonsillectomy  History of cholecystectomy  Knee arthropathy: right knee        MEDICATIONS  (STANDING):  ATENolol  Tablet 25 milliGRAM(s) Oral daily  chlorhexidine 4% Liquid 1 Application(s) Topical <User Schedule>  ciprofloxacin   IVPB 400 milliGRAM(s) IV Intermittent every 12 hours  ciprofloxacin   IVPB      heparin   Injectable 5000 Unit(s) SubCutaneous every 12 hours  lisinopril 20 milliGRAM(s) Oral daily  metroNIDAZOLE  IVPB      metroNIDAZOLE  IVPB 500 milliGRAM(s) IV Intermittent every 8 hours  pantoprazole    Tablet 40 milliGRAM(s) Oral before breakfast    MEDICATIONS  (PRN):  acetaminophen   Tablet .. 650 milliGRAM(s) Oral every 6 hours PRN Mild Pain (1 - 3)  dicyclomine 20 milliGRAM(s) Oral three times a day before meals PRN gas pain  morphine  - Injectable 2 milliGRAM(s) IV Push every 4 hours PRN Moderate Pain (4 - 6)  ondansetron Injectable 4 milliGRAM(s) IV Push every 8 hours PRN Nausea and/or Vomiting      Allergies  No Known Allergies      Review of Systems  General:  Denies Fatigue, Denies Fever, Denies Weakness ,Denies Weight Loss   HEENT: Denies Trouble Swallowing ,Denies  Sore Throat , Denies Change in hearing/vision/speech ,Denies Dizziness    Cardio: Denies  Chest Pain , Palpitations    Respiratory: Denies worsening of SOB, Denies Cough  Abdomen: See detailed HPI  Neuro: Denies Headache Denies Dizziness, Denies Paresthesias  MSK: Denies pain in Bones/Joints/Muscles   Psych: Patient denies depression, denies suicidal or homicidal ideations  Integ: Patient Denies rash, or new skin lesions         Vital Signs  T(F): 99.3 (02 Jul 2020 16:01), Max: 99.3 (02 Jul 2020 16:01)  HR: 62 (02 Jul 2020 16:01) (62 - 84)  BP: 120/61 (02 Jul 2020 16:01) (120/61 - 160/81)  RR: 18 (02 Jul 2020 16:01) (18 - 18)  SpO2: 97% (02 Jul 2020 16:01) (97% - 100%)  Physical Exam  Gen: NAD  HEENT: NC/AT  Cardio: S1/S2  Resp: CTA B/L  Abdomen: Soft, ND/slight TTP  Neuro: AAOx3  Extremities: FROM x 4      LABS:                        13.8   11.86 )-----------( 290      ( 02 Jul 2020 16:20 )             39.9     07-02    136  |  100  |  12  ----------------------------<  113<H>  4.7   |  23  |  0.7    Ca    9.0      02 Jul 2020 16:20    TPro  7.1  /  Alb  4.3  /  TBili  1.0  /  DBili  x   /  AST  34  /  ALT  23  /  AlkPhos  65  07-02

## 2020-07-02 NOTE — ED ADULT TRIAGE NOTE - TEMPERATURE IN FAHRENHEIT (DEGREES F)
VACCINE ADMINISTRATION RECORD  PARENT / GUARDIAN APPROVAL  Date: 3/13/2018  Vaccine administered to: Pal Mejía     : 2018    MRN: [id number]    A copy of the appropriate Centers for Disease Control and Prevention Vaccine Information statement h
98.2

## 2020-07-02 NOTE — ED PROVIDER NOTE - OBJECTIVE STATEMENT
Tolerated 6 hrs 42 minutes intra-operative crrt. Removed 1328cc's fluid during tx. Post tx both femoral and ij catheter ports flushed,capped and secured. Report given to Geraldine MCCALL per handoff guidelines   pt with pmhx chronic GERD, Diverticulitis, HTN, JOANN on CPAP s/p recent polypectomy with subsequent hospital admission for abd pain/pain control, dc 1 day ago, presents again today for lower abd pain despite liquid diet, cipro/flagyl. pain is sharp, nonradiating, moderate. denies exacerbating or relieving factors. Denies fever/chill/HA/dizziness/chest pain/palpitation/sob/v/d/ black stool/bloody stool/urinary sxs

## 2020-07-02 NOTE — ED PROVIDER NOTE - ATTENDING CONTRIBUTION TO CARE
I personally evaluated the patient. I reviewed the Resident’s or Physician Assistant’s note (as assigned above), and agree with the findings and plan except as documented in my note.     70 year old female with a pmh of gerd htn shankar diverticulosis s/p polypectomy by dr. lozano 6/29/20 with an admission afterwards for observation presents here c/o rlq to llq pain. Pain is intermittent currently 2/10 but worsens. Patient states she's only been able to tolerate liquids due to nausea. Patient has been passing gas. Denies any fever chills cough cp sob vomiting urinary frequency urgency burning.    on exam  CONSTITUTIONAL: WA / WN / NAD  HEAD: NCAT  EYES: PERRL; EOMI;   ENT: Normal pharynx; mucous membranes pink/moist, no erythema.  NECK: Supple; no meningeal signs  CARD: RRR; nl S1/S2; no M/R/G.   RESP: Respiratory rate and effort are normal; breath sounds clear and equal bilaterally.  ABD: Soft ttp rlq & llq. + bs   MSK/EXT: No gross deformities; full range of motion.  SKIN: Warm and dry;   NEURO: AAOx3  PSYCH: Memory Intact, Normal Affect

## 2020-07-02 NOTE — ED PROVIDER NOTE - NS ED ROS FT
Constitutional: no fever, chills, no recent weight loss, change in appetite or malaise  Eyes: no redness/discharge/pain/vision changes  ENT: no rhinorrhea/ear pain/sore throat  Cardiac: No chest pain, SOB or edema.  Respiratory: No cough or respiratory distress  GI: No vomiting, diarrhea   : No dysuria, frequency, urgency or hematuria  MS: no pain to back or extremities, no loss of ROM, no weakness  Neuro: No headache or weakness. No LOC.  Skin: No skin rash.  Endocrine: No history of thyroid disease or diabetes.  Except as documented in the HPI, all other systems are negative.

## 2020-07-02 NOTE — ED PROVIDER NOTE - PROGRESS NOTE DETAILS
SR: spoke with Dr. Beltran from GI aware of consult . SR: patient evaluated by dr. ocasio will r.u SBO. If normal will dc. patient has already been on cipro flagyl . pt feeling better and wants to go home; CT scan read and GI c/s called to discuss dispo as per gi, dc home to f/u as outpt

## 2020-07-03 LAB
CULTURE RESULTS: NO GROWTH — SIGNIFICANT CHANGE UP
SPECIMEN SOURCE: SIGNIFICANT CHANGE UP

## 2021-02-16 NOTE — ED PROVIDER NOTE - PHYSICAL EXAMINATION
OFFICE NOTE    21  Name: Mansi Babb  :1946   Sex:female   Age:74 y.o. SUBJECTIVE  Chief Complaint   Patient presents with    Leg Pain     RLE       HPI reports sharp stabbing pain irght gluteus muscle. Pain shooting down leg when stands. Clenches buttock muscles or walks    Review of Systems   Denies weakness, bowel or bladder incontinance.  Symptoms for 6 mos seems to be getting worse    Current Outpatient Medications:     tiZANidine (ZANAFLEX) 2 MG tablet, Take 1 tablet by mouth nightly as needed (sciatica.), Disp: 10 tablet, Rfl: 1    benzonatate (TESSALON) 100 MG capsule, take 1 TO 2 capsules by mouth three times a day if needed for cough, Disp: , Rfl:     amoxicillin (AMOXIL) 500 MG capsule, take 1 capsule by mouth three times a day, Disp: , Rfl:     fluticasone (FLONASE) 50 MCG/ACT nasal spray, 2 sprays by Each Nostril route daily, Disp: 1 Bottle, Rfl: 0    amLODIPine (NORVASC) 5 MG tablet, Take 0.5 tablets by mouth daily (Patient taking differently: Take 5 mg by mouth daily ), Disp: 45 tablet, Rfl: 3    metoprolol succinate (TOPROL XL) 25 MG extended release tablet, Take 0.5 tablets by mouth 2 times daily 12.5mg BID, Disp: 90 tablet, Rfl: 3    olmesartan (BENICAR) 20 MG tablet, Take 1 tablet by mouth every morning, Disp: 90 tablet, Rfl: 3    omeprazole (PRILOSEC) 40 MG delayed release capsule, Take 1 capsule by mouth every morning, Disp: 90 capsule, Rfl: 3    rosuvastatin (CRESTOR) 10 MG tablet, Take 1 tablet by mouth nightly, Disp: 90 tablet, Rfl: 3    SYNTHROID 75 MCG tablet, Take 1 tablet by mouth every morning, Disp: 90 tablet, Rfl: 1    vitamin D (CHOLECALCIFEROL) 125 MCG (5000 UT) CAPS capsule, Take 5,000 Units by mouth daily, Disp: , Rfl:     metFORMIN (GLUCOPHAGE) 500 MG tablet, 500 mg daily (with breakfast) , Disp: , Rfl: Drug Use Status  No          Sexual Activity     Sexually Active  Not Currently Partners  Male                OBJECTIVE  Vitals:    02/16/21 1027   BP: 136/74   Site: Left Upper Arm   Position: Sitting   Pulse: 111   Temp: 97.1 °F (36.2 °C)   TempSrc: Temporal   SpO2: 95%   Weight: 162 lb 6.4 oz (73.7 kg)        Body mass index is 31.98 kg/m². Orders Placed This Encounter   Procedures    MRI LUMBAR SPINE WO CONTRAST     Standing Status:   Future     Standing Expiration Date:   2/16/2022     Order Specific Question:   Reason for exam:     Answer:   6 mos effort related with walking. pain in gluteus muscle. Am wondering it piriformis syndrome    External Referral To Physical Medicine Rehab     Referral Priority:   Routine     Referral Type:   Eval and Treat     Referral Reason:   Specialty Services Required     Requested Specialty:   Physical Medicine and Rehab     Number of Visits Requested:   1        EXAM   Physical Exam  Vitals signs and nursing note reviewed. Constitutional:       Appearance: Normal appearance. Comments: Overweight. Cardiovascular:      Rate and Rhythm: Normal rate and regular rhythm. Pulmonary:      Effort: Pulmonary effort is normal.      Breath sounds: Normal breath sounds. No wheezing. Abdominal:      General: Bowel sounds are normal.      Palpations: There is no mass. Tenderness: There is no abdominal tenderness. Musculoskeletal: Normal range of motion. Right lower leg: No edema. Left lower leg: No edema. Comments: Point tenderness over mid gluteus muscle on right. Negative SLRs    Skin:     Coloration: Skin is not jaundiced. Findings: No bruising. Neurological:      General: No focal deficit present. Mental Status: She is alert and oriented to person, place, and time. Psychiatric:         Mood and Affect: Mood normal.         Behavior: Behavior normal.           Camila was seen today for leg pain. Diagnoses and all orders for this visit:    Sciatica, right side  -     MRI LUMBAR SPINE WO CONTRAST; Future  -     External Referral To Physical Medicine Rehab  -     tiZANidine (ZANAFLEX) 2 MG tablet; Take 1 tablet by mouth nightly as needed (sciatica.)    Suspect she might have piriformis syndrome. Will refer to Dr. Luis Clarke for definitive manageament      No follow-ups on file.     Electronically signed by Lucie Dutton MD on 2/16/21 at 11:01 AM EST CONSTITUTIONAL: Well-appearing; well-nourished; in no apparent distress.   CARDIOVASCULAR: Normal S1, S2; no murmurs, rubs, or gallops.   RESPIRATORY: Normal chest excursion with respiration; breath sounds clear and equal bilaterally; no wheezes, rhonchi, or rales.  GI/: mild lower abd ttp; non-distended; no palpable organomegaly.   SKIN: Normal for age and race; warm; dry; good turgor; no apparent lesions or exudate.   NEURO/PSYCH: A & O x 4; grossly unremarkable. mood and manner are appropriate. Grooming and personal hygiene are appropriate. No apparent thoughts of harm to self or others.

## 2021-05-12 ENCOUNTER — APPOINTMENT (OUTPATIENT)
Dept: GASTROENTEROLOGY | Facility: CLINIC | Age: 71
End: 2021-05-12

## 2021-05-18 ENCOUNTER — APPOINTMENT (OUTPATIENT)
Dept: PULMONOLOGY | Facility: CLINIC | Age: 71
End: 2021-05-18
Payer: MEDICARE

## 2021-05-18 VITALS
HEIGHT: 62 IN | RESPIRATION RATE: 14 BRPM | WEIGHT: 200 LBS | BODY MASS INDEX: 36.8 KG/M2 | HEART RATE: 70 BPM | SYSTOLIC BLOOD PRESSURE: 128 MMHG | DIASTOLIC BLOOD PRESSURE: 78 MMHG | OXYGEN SATURATION: 98 %

## 2021-05-18 DIAGNOSIS — G47.19 OTHER HYPERSOMNIA: ICD-10-CM

## 2021-05-18 DIAGNOSIS — G47.33 OBSTRUCTIVE SLEEP APNEA (ADULT) (PEDIATRIC): ICD-10-CM

## 2021-05-18 DIAGNOSIS — Z86.69 PERSONAL HISTORY OF OTHER DISEASES OF THE NERVOUS SYSTEM AND SENSE ORGANS: ICD-10-CM

## 2021-05-18 PROCEDURE — 99203 OFFICE O/P NEW LOW 30 MIN: CPT

## 2021-05-25 PROBLEM — G47.19 EXCESSIVE DAYTIME SLEEPINESS: Status: ACTIVE | Noted: 2021-05-25

## 2021-05-25 NOTE — DISCUSSION/SUMMARY
[FreeTextEntry1] : SP HOME STUDY, MILD JOANN, REPORTS TYPICAL SYMPTOMS WITH EDS, WILL ORDER APAP\par \par \par

## 2021-05-25 NOTE — HISTORY OF PRESENT ILLNESS
[Initial Eval - Existing Diagnosis] : an initial evaluation of an existing diagnosis of [Excessive Sleepiness-Day] : excessive daytime sleepiness [Snoring] : snoring [Unrefreshing Sleep] : unrefreshing sleep [Sleepy When Sedentary] : sleepy when sedentary [Memory Problems] : memory problems [Daily] : occur daily [CPAP] : CPAP [___ Year(s) Ago] : [unfilled] year(s) ago [Obesity] : obesity [TextBox_4] : 72 yo with PMHx of HTN/Colon Ca s/p resection and JOANN diagnosed by dr morley 15 years ago, came in for evaluation of her sleep apnea secndary to change in her insurance([employer]).\par Patient is at CPAP of 5.\par

## 2021-05-25 NOTE — PHYSICAL EXAM
[No Acute Distress] : no acute distress [Normal Oropharynx] : normal oropharynx [Normal Appearance] : normal appearance [No Neck Mass] : no neck mass [Normal Rate/Rhythm] : normal rate/rhythm [Normal S1, S2] : normal s1, s2 [No Murmurs] : no murmurs [No Resp Distress] : no resp distress [Clear to Auscultation Bilaterally] : clear to auscultation bilaterally [No Abnormalities] : no abnormalities [Benign] : benign [Normal Gait] : normal gait [No Clubbing] : no clubbing [No Cyanosis] : no cyanosis [No Edema] : no edema [FROM] : FROM [Normal Color/ Pigmentation] : normal color/ pigmentation [No Focal Deficits] : no focal deficits [Oriented x3] : oriented x3 [Normal Affect] : normal affect [TextBox_105] : varices

## 2021-05-25 NOTE — ASSESSMENT
[FreeTextEntry1] : 72 yo lady with pmhx of JOANN/HTn/colon ca s/p resection came in for evaluation of her sleep apnea, had a change in her insurance last sleep study >5 years ago.\par \par \par Plan:\par \par Home sleep study\par APAP\par Weight loss\par \par

## 2021-05-25 NOTE — END OF VISIT
[] : Fellow [FreeTextEntry3] : JOANN COMPLIANT AND BENEFITING HOWEVER PROBLEM WITH MACHINED, WILL UNDERGO HST AND ORDER APAP REPORTS TYPICAL SYMPTOMS

## 2021-12-18 ENCOUNTER — TRANSCRIPTION ENCOUNTER (OUTPATIENT)
Age: 71
End: 2021-12-18

## 2021-12-21 ENCOUNTER — TRANSCRIPTION ENCOUNTER (OUTPATIENT)
Age: 71
End: 2021-12-21

## 2022-07-09 ENCOUNTER — OUTPATIENT (OUTPATIENT)
Dept: OUTPATIENT SERVICES | Facility: HOSPITAL | Age: 72
LOS: 1 days | Discharge: HOME | End: 2022-07-09

## 2022-07-09 DIAGNOSIS — Z12.31 ENCOUNTER FOR SCREENING MAMMOGRAM FOR MALIGNANT NEOPLASM OF BREAST: ICD-10-CM

## 2022-07-09 DIAGNOSIS — Z90.89 ACQUIRED ABSENCE OF OTHER ORGANS: Chronic | ICD-10-CM

## 2022-07-09 DIAGNOSIS — Z90.49 ACQUIRED ABSENCE OF OTHER SPECIFIED PARTS OF DIGESTIVE TRACT: Chronic | ICD-10-CM

## 2022-07-09 DIAGNOSIS — M17.10 UNILATERAL PRIMARY OSTEOARTHRITIS, UNSPECIFIED KNEE: Chronic | ICD-10-CM

## 2022-07-09 PROCEDURE — 77063 BREAST TOMOSYNTHESIS BI: CPT | Mod: 26

## 2022-07-09 PROCEDURE — 77067 SCR MAMMO BI INCL CAD: CPT | Mod: 26

## 2022-07-11 DIAGNOSIS — N95.9 UNSPECIFIED MENOPAUSAL AND PERIMENOPAUSAL DISORDER: ICD-10-CM

## 2022-07-11 DIAGNOSIS — Z82.62 FAMILY HISTORY OF OSTEOPOROSIS: ICD-10-CM

## 2022-07-11 DIAGNOSIS — Z13.820 ENCOUNTER FOR SCREENING FOR OSTEOPOROSIS: ICD-10-CM

## 2022-07-25 ENCOUNTER — OUTPATIENT (OUTPATIENT)
Dept: OUTPATIENT SERVICES | Facility: HOSPITAL | Age: 72
LOS: 1 days | Discharge: HOME | End: 2022-07-25

## 2022-07-25 DIAGNOSIS — M17.10 UNILATERAL PRIMARY OSTEOARTHRITIS, UNSPECIFIED KNEE: Chronic | ICD-10-CM

## 2022-07-25 DIAGNOSIS — Z90.89 ACQUIRED ABSENCE OF OTHER ORGANS: Chronic | ICD-10-CM

## 2022-07-25 DIAGNOSIS — R92.8 OTHER ABNORMAL AND INCONCLUSIVE FINDINGS ON DIAGNOSTIC IMAGING OF BREAST: ICD-10-CM

## 2022-07-25 DIAGNOSIS — Z90.49 ACQUIRED ABSENCE OF OTHER SPECIFIED PARTS OF DIGESTIVE TRACT: Chronic | ICD-10-CM

## 2022-07-25 PROCEDURE — G0279: CPT | Mod: 26

## 2022-07-25 PROCEDURE — 77065 DX MAMMO INCL CAD UNI: CPT | Mod: 26,RT

## 2022-08-01 ENCOUNTER — RESULT REVIEW (OUTPATIENT)
Age: 72
End: 2022-08-01

## 2022-08-01 ENCOUNTER — OUTPATIENT (OUTPATIENT)
Dept: OUTPATIENT SERVICES | Facility: HOSPITAL | Age: 72
LOS: 1 days | Discharge: HOME | End: 2022-08-01

## 2022-08-01 DIAGNOSIS — Z90.89 ACQUIRED ABSENCE OF OTHER ORGANS: Chronic | ICD-10-CM

## 2022-08-01 DIAGNOSIS — Z90.49 ACQUIRED ABSENCE OF OTHER SPECIFIED PARTS OF DIGESTIVE TRACT: Chronic | ICD-10-CM

## 2022-08-01 DIAGNOSIS — M17.10 UNILATERAL PRIMARY OSTEOARTHRITIS, UNSPECIFIED KNEE: Chronic | ICD-10-CM

## 2022-08-01 PROCEDURE — 88305 TISSUE EXAM BY PATHOLOGIST: CPT | Mod: 26

## 2022-08-01 PROCEDURE — 76098 X-RAY EXAM SURGICAL SPECIMEN: CPT | Mod: 26

## 2022-08-01 PROCEDURE — 19081 BX BREAST 1ST LESION STRTCTC: CPT | Mod: RT

## 2022-08-04 DIAGNOSIS — N63.10 UNSPECIFIED LUMP IN THE RIGHT BREAST, UNSPECIFIED QUADRANT: ICD-10-CM

## 2022-08-05 LAB — SURGICAL PATHOLOGY STUDY: SIGNIFICANT CHANGE UP

## 2023-02-28 NOTE — DISCHARGE NOTE PROVIDER - PROVIDER TOKENS
Return to Urgent Care or go to ER if symptoms worsen or fail to improve.  Follow up with PCP as recommended for further management.   We will notify you of any test results (if applicable) in 3-5 days.  
PROVIDER:[TOKEN:[[id number]]]

## 2023-08-24 ENCOUNTER — OUTPATIENT (OUTPATIENT)
Dept: OUTPATIENT SERVICES | Facility: HOSPITAL | Age: 73
LOS: 1 days | End: 2023-08-24
Payer: MEDICARE

## 2023-08-24 DIAGNOSIS — R10.9 UNSPECIFIED ABDOMINAL PAIN: ICD-10-CM

## 2023-08-24 DIAGNOSIS — K59.00 CONSTIPATION, UNSPECIFIED: ICD-10-CM

## 2023-08-24 DIAGNOSIS — Z90.89 ACQUIRED ABSENCE OF OTHER ORGANS: Chronic | ICD-10-CM

## 2023-08-24 DIAGNOSIS — M17.10 UNILATERAL PRIMARY OSTEOARTHRITIS, UNSPECIFIED KNEE: Chronic | ICD-10-CM

## 2023-08-24 DIAGNOSIS — Z90.49 ACQUIRED ABSENCE OF OTHER SPECIFIED PARTS OF DIGESTIVE TRACT: Chronic | ICD-10-CM

## 2023-08-24 PROCEDURE — 74018 RADEX ABDOMEN 1 VIEW: CPT

## 2023-08-24 PROCEDURE — 76000 FLUOROSCOPY <1 HR PHYS/QHP: CPT | Mod: 26

## 2023-08-25 DIAGNOSIS — K59.00 CONSTIPATION, UNSPECIFIED: ICD-10-CM

## 2023-08-25 DIAGNOSIS — R10.9 UNSPECIFIED ABDOMINAL PAIN: ICD-10-CM

## 2023-08-30 NOTE — PRE-ANESTHESIA EVALUATION ADULT - BSA (M2)
Pt here for 3 month check up     C/O a lot of joint pain and fatigue-denies chest pain or SOB    Imdur started at last visit. Pt c/o dizziness and headaches and it was stopped.
Velocity: 134                            cm/s                    cm/s   MV Peak Gradient: 5.38   AV Peak Gradient: 10.63 LVOT Peak Gradient: 7   mmHg                     mmHg                    mmHg      MV Deceleration Time:                            TV Peak E-Wave: 73.7 cm/s   254 msec                                         TV Peak A-Wave: 36.4 cm/s                               AV P1/2t: 544 msec      TV Peak Gradient: 2.17                                                    mmHg                                                    TR Velocity:273 cm/s                                                    TR Gradient:29.81 mmHg   MR Velocity: 366 cm/s    AV DVI (Vmax):0.82      PV Peak Velocity: 80.5                                                    cm/s                                                    PV Peak Gradient: 2.59                                                    mmHg     [email or web address]  [id number]       STRESS:    CATH:    Assessment/Plan       Diagnosis Orders   1. Coronary artery disease due to lipid rich plaque              CAD s/p PCI  Diastolic CHF  Afib on Xarelto  HTN  HLD  S/p PPM    Tried imdur but unable to tolerate   Discussed diuretic dosage  Patients daughter to titrate dosage to keep weight at 153 (she felt best at that weight)  On Bumex 1 mg daily and potassium  Had issues with low potassium, this was doubled up  Will give aldactone 25mg daily  Continue losartan, digoxin, , lasix, Aspirin  Continue Xarelto  No bleeding issue  The patient is asked to make an attempt to improve diet and exercise patterns to aid in medical management of this problem. Advised patient to call office or seek immediate medical attention if there is any new onset of  any chest pain, sob, palpitations, lightheadedness, dizziness, orthopnea, PND or pedal edema.       All medication side effects were
1.91

## 2023-10-09 ENCOUNTER — OUTPATIENT (OUTPATIENT)
Dept: OUTPATIENT SERVICES | Facility: HOSPITAL | Age: 73
LOS: 1 days | End: 2023-10-09
Payer: MEDICARE

## 2023-10-09 DIAGNOSIS — Z90.49 ACQUIRED ABSENCE OF OTHER SPECIFIED PARTS OF DIGESTIVE TRACT: Chronic | ICD-10-CM

## 2023-10-09 DIAGNOSIS — Z90.89 ACQUIRED ABSENCE OF OTHER ORGANS: Chronic | ICD-10-CM

## 2023-10-09 DIAGNOSIS — Z12.31 ENCOUNTER FOR SCREENING MAMMOGRAM FOR MALIGNANT NEOPLASM OF BREAST: ICD-10-CM

## 2023-10-09 DIAGNOSIS — M17.10 UNILATERAL PRIMARY OSTEOARTHRITIS, UNSPECIFIED KNEE: Chronic | ICD-10-CM

## 2023-10-09 PROCEDURE — 76641 ULTRASOUND BREAST COMPLETE: CPT | Mod: 26,50

## 2023-10-09 PROCEDURE — G0279: CPT | Mod: 26

## 2023-10-09 PROCEDURE — 77066 DX MAMMO INCL CAD BI: CPT | Mod: 26

## 2023-10-09 PROCEDURE — 77066 DX MAMMO INCL CAD BI: CPT

## 2023-10-09 PROCEDURE — 76641 ULTRASOUND BREAST COMPLETE: CPT | Mod: 50

## 2023-10-09 PROCEDURE — G0279: CPT

## 2023-10-10 DIAGNOSIS — Z12.31 ENCOUNTER FOR SCREENING MAMMOGRAM FOR MALIGNANT NEOPLASM OF BREAST: ICD-10-CM

## 2024-03-25 NOTE — PATIENT PROFILE ADULT - DISASTER - PRO INTERPRETER NEED 2
English
Pt with right lower back pain radiates down leg x one month. pt states no relief with tylenol. fs 73 apple juice given in triage.